# Patient Record
Sex: FEMALE | Race: WHITE | NOT HISPANIC OR LATINO | URBAN - METROPOLITAN AREA
[De-identification: names, ages, dates, MRNs, and addresses within clinical notes are randomized per-mention and may not be internally consistent; named-entity substitution may affect disease eponyms.]

---

## 2023-08-03 ENCOUNTER — DOCUMENTATION (OUTPATIENT)
Dept: PRIMARY CARE | Facility: CLINIC | Age: 78
End: 2023-08-03

## 2023-08-03 NOTE — PROGRESS NOTES
Spoke with urgent care regarding this pt. Pt was very upset that dr almonte wanted her to have an appt, pt lov was in 2018, pt was told today she would need to be seen before dr almonte would sign dot physical